# Patient Record
Sex: FEMALE | URBAN - METROPOLITAN AREA
[De-identification: names, ages, dates, MRNs, and addresses within clinical notes are randomized per-mention and may not be internally consistent; named-entity substitution may affect disease eponyms.]

---

## 2022-02-21 ENCOUNTER — TRANSCRIBE ORDER (OUTPATIENT)
Dept: SCHEDULING | Age: 37
End: 2022-02-21

## 2022-02-21 DIAGNOSIS — M25.512 LEFT SHOULDER PAIN: Primary | ICD-10-CM

## 2022-02-21 DIAGNOSIS — M75.42 IMPINGEMENT SYNDROME OF LEFT SHOULDER: ICD-10-CM

## 2022-02-22 ENCOUNTER — TRANSCRIBE ORDER (OUTPATIENT)
Dept: SCHEDULING | Age: 37
End: 2022-02-22

## 2022-02-22 DIAGNOSIS — M75.42 IMPINGEMENT SYNDROME OF LEFT SHOULDER: ICD-10-CM

## 2022-02-22 DIAGNOSIS — M25.512 LEFT SHOULDER PAIN: Primary | ICD-10-CM

## 2024-11-04 ENCOUNTER — HOSPITAL ENCOUNTER (INPATIENT)
Facility: HOSPITAL | Age: 39
LOS: 8 days | Discharge: NURSING, REHAB OR LONG-TERM CARE | DRG: 776 | End: 2024-11-12
Attending: EMERGENCY MEDICINE | Admitting: PSYCHIATRY & NEUROLOGY
Payer: COMMERCIAL

## 2024-11-04 DIAGNOSIS — R45.851 SUICIDAL IDEATION: Primary | ICD-10-CM

## 2024-11-04 PROBLEM — F19.94 SUBSTANCE INDUCED MOOD DISORDER (HCC): Status: ACTIVE | Noted: 2024-11-04

## 2024-11-04 LAB
ALBUMIN SERPL-MCNC: 3.9 G/DL (ref 3.5–5)
ALBUMIN/GLOB SERPL: 1 (ref 1.1–2.2)
ALP SERPL-CCNC: 143 U/L (ref 45–117)
ALT SERPL-CCNC: 54 U/L (ref 12–78)
AMPHET UR QL SCN: POSITIVE
ANION GAP SERPL CALC-SCNC: 12 MMOL/L (ref 2–12)
APPEARANCE UR: CLEAR
AST SERPL-CCNC: 39 U/L (ref 15–37)
BACTERIA URNS QL MICRO: NEGATIVE /HPF
BARBITURATES UR QL SCN: NEGATIVE
BASOPHILS # BLD: 0.1 K/UL (ref 0–0.1)
BASOPHILS NFR BLD: 1 % (ref 0–1)
BENZODIAZ UR QL: NEGATIVE
BILIRUB SERPL-MCNC: 0.5 MG/DL (ref 0.2–1)
BILIRUB UR QL: NEGATIVE
BUN SERPL-MCNC: 5 MG/DL (ref 6–20)
BUN/CREAT SERPL: 6 (ref 12–20)
CALCIUM SERPL-MCNC: 9.9 MG/DL (ref 8.5–10.1)
CANNABINOIDS UR QL SCN: POSITIVE
CHLORIDE SERPL-SCNC: 105 MMOL/L (ref 97–108)
CO2 SERPL-SCNC: 24 MMOL/L (ref 21–32)
COCAINE UR QL SCN: NEGATIVE
COLOR UR: ABNORMAL
CREAT SERPL-MCNC: 0.8 MG/DL (ref 0.55–1.02)
DIFFERENTIAL METHOD BLD: ABNORMAL
EOSINOPHIL # BLD: 0.3 K/UL (ref 0–0.4)
EOSINOPHIL NFR BLD: 3 % (ref 0–7)
EPITH CASTS URNS QL MICRO: ABNORMAL /LPF
ERYTHROCYTE [DISTWIDTH] IN BLOOD BY AUTOMATED COUNT: 13.6 % (ref 11.5–14.5)
ETHANOL SERPL-MCNC: <10 MG/DL (ref 0–0.08)
GLOBULIN SER CALC-MCNC: 4.1 G/DL (ref 2–4)
GLUCOSE SERPL-MCNC: 92 MG/DL (ref 65–100)
GLUCOSE UR STRIP.AUTO-MCNC: NEGATIVE MG/DL
HCG UR QL: NEGATIVE
HCT VFR BLD AUTO: 43.2 % (ref 35–47)
HGB BLD-MCNC: 15.3 G/DL (ref 11.5–16)
HGB UR QL STRIP: NEGATIVE
IMM GRANULOCYTES # BLD AUTO: 0.1 K/UL (ref 0–0.04)
IMM GRANULOCYTES NFR BLD AUTO: 1 % (ref 0–0.5)
KETONES UR QL STRIP.AUTO: ABNORMAL MG/DL
LEUKOCYTE ESTERASE UR QL STRIP.AUTO: ABNORMAL
LYMPHOCYTES # BLD: 3.4 K/UL (ref 0.8–3.5)
LYMPHOCYTES NFR BLD: 31 % (ref 12–49)
Lab: ABNORMAL
MCH RBC QN AUTO: 29.2 PG (ref 26–34)
MCHC RBC AUTO-ENTMCNC: 35.4 G/DL (ref 30–36.5)
MCV RBC AUTO: 82.4 FL (ref 80–99)
METHADONE UR QL: NEGATIVE
MONOCYTES # BLD: 0.9 K/UL (ref 0–1)
MONOCYTES NFR BLD: 8 % (ref 5–13)
NEUTS SEG # BLD: 6 K/UL (ref 1.8–8)
NEUTS SEG NFR BLD: 56 % (ref 32–75)
NITRITE UR QL STRIP.AUTO: NEGATIVE
NRBC # BLD: 0 K/UL (ref 0–0.01)
NRBC BLD-RTO: 0 PER 100 WBC
OPIATES UR QL: NEGATIVE
PCP UR QL: NEGATIVE
PH UR STRIP: 6.5 (ref 5–8)
PLATELET # BLD AUTO: 292 K/UL (ref 150–400)
PMV BLD AUTO: 9.4 FL (ref 8.9–12.9)
POTASSIUM SERPL-SCNC: 3.4 MMOL/L (ref 3.5–5.1)
PROT SERPL-MCNC: 8 G/DL (ref 6.4–8.2)
PROT UR STRIP-MCNC: NEGATIVE MG/DL
RBC # BLD AUTO: 5.24 M/UL (ref 3.8–5.2)
RBC #/AREA URNS HPF: ABNORMAL /HPF (ref 0–5)
SODIUM SERPL-SCNC: 141 MMOL/L (ref 136–145)
SP GR UR REFRACTOMETRY: 1.02
URINE CULTURE IF INDICATED: ABNORMAL
UROBILINOGEN UR QL STRIP.AUTO: 1 EU/DL (ref 0.2–1)
WBC # BLD AUTO: 10.7 K/UL (ref 3.6–11)
WBC URNS QL MICRO: ABNORMAL /HPF (ref 0–4)

## 2024-11-04 PROCEDURE — 36415 COLL VENOUS BLD VENIPUNCTURE: CPT

## 2024-11-04 PROCEDURE — 82077 ASSAY SPEC XCP UR&BREATH IA: CPT

## 2024-11-04 PROCEDURE — 1240000000 HC EMOTIONAL WELLNESS R&B

## 2024-11-04 PROCEDURE — 99285 EMERGENCY DEPT VISIT HI MDM: CPT

## 2024-11-04 PROCEDURE — 6370000000 HC RX 637 (ALT 250 FOR IP): Performed by: PSYCHIATRY & NEUROLOGY

## 2024-11-04 PROCEDURE — 80053 COMPREHEN METABOLIC PANEL: CPT

## 2024-11-04 PROCEDURE — 80307 DRUG TEST PRSMV CHEM ANLYZR: CPT

## 2024-11-04 PROCEDURE — 81001 URINALYSIS AUTO W/SCOPE: CPT

## 2024-11-04 PROCEDURE — 85025 COMPLETE CBC W/AUTO DIFF WBC: CPT

## 2024-11-04 PROCEDURE — 6370000000 HC RX 637 (ALT 250 FOR IP): Performed by: EMERGENCY MEDICINE

## 2024-11-04 PROCEDURE — 81025 URINE PREGNANCY TEST: CPT

## 2024-11-04 RX ORDER — HYDROXYZINE HYDROCHLORIDE 25 MG/1
50 TABLET, FILM COATED ORAL 3 TIMES DAILY PRN
Status: DISCONTINUED | OUTPATIENT
Start: 2024-11-04 | End: 2024-11-12 | Stop reason: HOSPADM

## 2024-11-04 RX ORDER — RISPERIDONE 4 MG/1
4 TABLET ORAL DAILY
Status: ON HOLD | COMMUNITY
End: 2024-11-05 | Stop reason: ALTCHOICE

## 2024-11-04 RX ORDER — HALOPERIDOL 5 MG/ML
5 INJECTION INTRAMUSCULAR EVERY 4 HOURS PRN
Status: DISCONTINUED | OUTPATIENT
Start: 2024-11-04 | End: 2024-11-05

## 2024-11-04 RX ORDER — ONDANSETRON 4 MG/1
4 TABLET, FILM COATED ORAL EVERY 8 HOURS PRN
Status: ON HOLD | COMMUNITY
End: 2024-11-05 | Stop reason: ALTCHOICE

## 2024-11-04 RX ORDER — POTASSIUM CHLORIDE 750 MG/1
40 TABLET, EXTENDED RELEASE ORAL ONCE
Status: COMPLETED | OUTPATIENT
Start: 2024-11-04 | End: 2024-11-04

## 2024-11-04 RX ORDER — ACETAMINOPHEN 325 MG/1
650 TABLET ORAL EVERY 4 HOURS PRN
Status: DISCONTINUED | OUTPATIENT
Start: 2024-11-04 | End: 2024-11-12 | Stop reason: HOSPADM

## 2024-11-04 RX ORDER — DIPHENHYDRAMINE HYDROCHLORIDE 50 MG/ML
50 INJECTION INTRAMUSCULAR; INTRAVENOUS EVERY 4 HOURS PRN
Status: DISCONTINUED | OUTPATIENT
Start: 2024-11-04 | End: 2024-11-12 | Stop reason: HOSPADM

## 2024-11-04 RX ORDER — GABAPENTIN 300 MG/1
300 CAPSULE ORAL 3 TIMES DAILY
Status: ON HOLD | COMMUNITY
End: 2024-11-05 | Stop reason: ALTCHOICE

## 2024-11-04 RX ORDER — MAGNESIUM HYDROXIDE/ALUMINUM HYDROXICE/SIMETHICONE 120; 1200; 1200 MG/30ML; MG/30ML; MG/30ML
30 SUSPENSION ORAL EVERY 6 HOURS PRN
Status: DISCONTINUED | OUTPATIENT
Start: 2024-11-04 | End: 2024-11-12 | Stop reason: HOSPADM

## 2024-11-04 RX ORDER — POLYETHYLENE GLYCOL 3350 17 G/17G
17 POWDER, FOR SOLUTION ORAL DAILY PRN
Status: DISCONTINUED | OUTPATIENT
Start: 2024-11-04 | End: 2024-11-12 | Stop reason: HOSPADM

## 2024-11-04 RX ORDER — HALOPERIDOL 5 MG/1
5 TABLET ORAL EVERY 4 HOURS PRN
Status: DISCONTINUED | OUTPATIENT
Start: 2024-11-04 | End: 2024-11-05

## 2024-11-04 RX ORDER — IBUPROFEN 800 MG/1
800 TABLET, FILM COATED ORAL EVERY 8 HOURS PRN
Status: ON HOLD | COMMUNITY
End: 2024-11-05 | Stop reason: ALTCHOICE

## 2024-11-04 RX ORDER — HYDROXYZINE HYDROCHLORIDE 50 MG/1
50 TABLET, FILM COATED ORAL 2 TIMES DAILY PRN
Status: ON HOLD | COMMUNITY
End: 2024-11-05 | Stop reason: ALTCHOICE

## 2024-11-04 RX ORDER — NICOTINE 21 MG/24HR
1 PATCH, TRANSDERMAL 24 HOURS TRANSDERMAL DAILY
Status: DISCONTINUED | OUTPATIENT
Start: 2024-11-04 | End: 2024-11-12 | Stop reason: HOSPADM

## 2024-11-04 RX ORDER — BUPROPION HYDROCHLORIDE 300 MG/1
300 TABLET ORAL EVERY MORNING
Status: ON HOLD | COMMUNITY
End: 2024-11-11 | Stop reason: HOSPADM

## 2024-11-04 RX ORDER — POTASSIUM CHLORIDE 750 MG/1
10 TABLET, EXTENDED RELEASE ORAL ONCE
Status: DISCONTINUED | OUTPATIENT
Start: 2024-11-04 | End: 2024-11-04

## 2024-11-04 RX ORDER — ALPRAZOLAM 1 MG/1
1 TABLET ORAL
Status: COMPLETED | OUTPATIENT
Start: 2024-11-04 | End: 2024-11-04

## 2024-11-04 RX ORDER — TRAZODONE HYDROCHLORIDE 50 MG/1
50 TABLET, FILM COATED ORAL NIGHTLY PRN
Status: DISCONTINUED | OUTPATIENT
Start: 2024-11-04 | End: 2024-11-05

## 2024-11-04 RX ADMIN — POTASSIUM CHLORIDE 40 MEQ: 750 TABLET, EXTENDED RELEASE ORAL at 11:15

## 2024-11-04 RX ADMIN — ALUMINUM HYDROXIDE, MAGNESIUM HYDROXIDE, AND SIMETHICONE 30 ML: 200; 200; 20 SUSPENSION ORAL at 21:12

## 2024-11-04 RX ADMIN — HYDROXYZINE HYDROCHLORIDE 50 MG: 25 TABLET, FILM COATED ORAL at 14:41

## 2024-11-04 RX ADMIN — ALPRAZOLAM 1 MG: 1 TABLET ORAL at 10:40

## 2024-11-04 RX ADMIN — ACETAMINOPHEN 650 MG: 325 TABLET ORAL at 21:12

## 2024-11-04 ASSESSMENT — PAIN SCALES - GENERAL
PAINLEVEL_OUTOF10: 0
PAINLEVEL_OUTOF10: 1
PAINLEVEL_OUTOF10: 0

## 2024-11-04 ASSESSMENT — PAIN - FUNCTIONAL ASSESSMENT: PAIN_FUNCTIONAL_ASSESSMENT: 0-10

## 2024-11-04 ASSESSMENT — SLEEP AND FATIGUE QUESTIONNAIRES
DO YOU HAVE DIFFICULTY SLEEPING: YES
AVERAGE NUMBER OF SLEEP HOURS: 4
DO YOU USE A SLEEP AID: NO

## 2024-11-04 ASSESSMENT — LIFESTYLE VARIABLES
HOW OFTEN DO YOU HAVE A DRINK CONTAINING ALCOHOL: 4 OR MORE TIMES A WEEK
HOW MANY STANDARD DRINKS CONTAINING ALCOHOL DO YOU HAVE ON A TYPICAL DAY: 3 OR 4

## 2024-11-04 NOTE — ED NOTES
Bedside and Verbal shift change report given to LAURIE Salazar (oncoming nurse) by LAURIE Quiroz (offgoing nurse). Report included the following information Nurse Handoff Report, ED Encounter Summary, ED SBAR, Intake/Output, MAR, and Recent Results.

## 2024-11-04 NOTE — ED NOTES
Pt presents to ED via EMS complaining of worsening anxiety, auditory hallucinations, HI and SI with a plan since last night. Pt reports she stopped taking her  psych medications 4 days ago and she had a panic attack today. Pt reports she recently relapsed on alcohol and marijuana. Pt states she wants to \"burn shit down\" and states \"I'm not right in the right right now.\" Pt reports she wants to take a knife and stab herself in the throat and also stab her roommate multiple times. Pt endorses hx of schizoaffective disorder, anxiety, bipolar, and manic depression. Pt is alert and oriented x 4, RR even and unlabored, skin is warm and dry. Pt appears in NAD at this time. Assessment completed and pt updated on plan of care.  Call bell in reach.    Emergency Department Nursing Plan of Care  The Nursing Plan of Care is developed from the Nursing assessment and Emergency Department Attending provider initial evaluation.  The plan of care may be reviewed in the “ED Provider note”.      The Plan of Care was developed with the following considerations:  Patient / Family readiness to learn indicated by:Refer to Medical chart in [facility]  Persons(s) to be included in education: Refer to Medical chart in [facility]  Barriers to Learning/Limitations:Normal     Signed    Gabriella Bourne RN  11/4/2024 10:15 AM

## 2024-11-04 NOTE — CARE COORDINATION
11/04/24 1444   Suicidal Ideation   Wish to be Dead Lifetime - Yes;Past 1 month - Yes   Non-Specific Active Suicidal Thoughts Lifetime - No;Past 1 month - No   Suicidal Behavior Trigger Wish to be Dead   Intensity of Ideation   Lifetime - Most Severe Ideation 4   Lifetime - Most Recent Ideation 3   Frequency 2-5 times in a week   Duration 4-8 hours/most of day   Controllability Can control thoughts with a lot of difficulty   Deterrents Deterrents most likely did not stop you   Reasons for Ideation Mostly to end or stop the pain   Suicidal Behavior   Actual Attempt Lifetime - Yes;Past 3 months - No   Total # of Attempts 1   Has subject engaged in Non-Suicidal Self-Injurious Behavior? Lifetime - No;Past 3 months - No   Interrupted Attempt Lifetime - No;Past 3 months - No   Total # of interrupted 0   Aborted or Self-Interrupted Attempt Lifetime - Yes;Past 3 months - No   Total # of aborted or self-interrupted 1   Preparatory Acts or Behavior Lifetime - Yes;Past 3 months - No   Total # of Preparatory Acts 1   Actual Lethality/Medical Damage 0   Potential Lethality 1     ______________________________________________  TREVER Ferguson, Supervisee in Social Work  [phone]  [email or web address]  11/04/24    
Work  [phone]  [email or web address]  11/04/24

## 2024-11-04 NOTE — ED NOTES
TRANSFER - OUT REPORT:    Verbal report given to LAURIE Conde on Caity Estrella  being transferred to [facility] for routine progression of patient care       Report consisted of patient's Situation, Background, Assessment and   Recommendations(SBAR).     Information from the following report(s) Nurse Handoff Report, ED Encounter Summary, ED SBAR, Adult Overview, Intake/Output, MAR, Recent Results, Med Rec Status, Quality Measures, and Neuro Assessment was reviewed with the receiving nurse.    [place] Fall Assessment:    Presents to emergency department  because of falls (Syncope, seizure, or loss of consciousness): No  Age > 70: No  Altered Mental Status, Intoxication with alcohol or substance confusion (Disorientation, impaired judgment, poor safety awaremess, or inability to follow instructions): No  Impaired Mobility: Ambulates or transfers with assistive devices or assistance; Unable to ambulate or transer.: No  Nursing Judgement: No        Lines:       Opportunity for questions and clarification was provided.      Patient transported with:  Security and Pts belongings

## 2024-11-04 NOTE — PROGRESS NOTES
Admission Medication Reconciliation:    Information obtained from:  patient interview, communication with [facility] pharmacy (phone number: [phone]), Insurance claims data, review of EMR, and [facility]  RxQuery data available¹:  YES    Comments/Recommendations: Updated PTA meds/reviewed patient's allergies.    1) Upon quick review of the patient's belongings. She had an empty hydroxyzine 50 mg bottle that was filled in August, written as 1 tab BID prn anxiety. She also had a gabapentin bottle from 10/23/24 for 300 mg caps that she stated was to help her \"come off of methadone\", there was 1 pill left in the bottle.    During an interview with the patient, she stated that PTA she was taking Depakote 500 mg daily, risperidone 4 mg daily, alprazolam 2 mg TID prn, and Wellbutrin 300 mg daily. Last time she took any medications was 4 days ago.    She has recent (10/24/24) short day supply fills for clonidine 0.1 mg q8h that she states was for BP, ibuprofen 800 mg q8h, naloxone 4 mg spray, and ondansetron 4 mg ODT q8h.    Patient recommended that we call [facility] on [address] in [place], [place] to get an accurate fill history. Spoke with [facility] employee who stated that the 2 most recents fills for this patient were on 10/18/2024 and 07/2024:  Wellbutrin  mg AM on 10/18  Aleve  mg q12h #50 in 07/2024  Suboxone 4mg-1mg film #16 in 07/2024  Prazosin 2 mg HS #30 in 07/2024    2)  The [place] Prescription Monitoring Program () was assessed to determine fill history of any controlled medications. The patient has filled the following controlled medications in the last 2 years.  - 10/24/2024: Gabapentin 300 mg, #21 for 7 day supply  - 10/24/2024: Naloxone 4 mg nasal spray, #2 for 1 day supply  - 07/26/2024: Suboxone 4 mg - 1 mg film, #16 for 16 day supply  - 07/26/2024: Buprenorphine-Naloxone 4 mg-1 mg film, #16 for 7 day supply  - 07/12/2024: Suboxone 8 mg - 2 mg film, #14 for 14 day supply    3)  Medication changes (since last

## 2024-11-04 NOTE — ED PROVIDER NOTES
[facility] 3 ACUTE BEHAV [facility]  EMERGENCY DEPARTMENT ENCOUNTER       Pt Name: Caity Estrella  MRN: [id number]  Birthdate 1985  Date of evaluation: 11/4/2024  Provider: Trisha Rm MD   PCP: No primary care provider on file.  Note Started: 5:34 PM 11/4/24     (Please note that parts of this dictation were completed with voice recognition software. Quite often unanticipated grammatical, syntax, homophones, and other interpretive errors are inadvertently transcribed by the computer software. Please disregards these errors. Please excuse any errors that have escaped final proofreading.)    CHIEF COMPLAINT       Chief Complaint   Patient presents with    Mental Health Problem        HISTORY OF PRESENT ILLNESS: 1 or more elements      History From: patient, History limited by:  none     Caity Estrella is a 39 y.o. female with a history of schizoaffective disorder, bipolar, depression, anxiety who presents via EMS with suicidal ideation.  Patient explains \"I stopped my meds and I have been trying to get an appointment.\"  Has been off her psych meds for 4 days.  States now \"the voices are bad.\"  States she is having \"bad anxiety.\" The  morning she had a panic attack.  Explains that she relapsed on marijuana and alcohol because the voices were so bad they were preventing her from sleep and she was smoking and drinking overnight to try and calm her nerves and get to the point where she could just pass out ...but instead she had a panic attack.  The voices are telling her to stab her roommate multiple times and also take a knife to her throat.  She states she flips back and forth between wanting to kill her roommate and wanting to kill herself.  Also states she feels \"like burning should down.\"  Admits that she is \"not right in the head.\"  Is willing to stay voluntarily for help. Requesting anxiolytic.     Nursing Notes were all reviewed and agreed with or any disagreements were addressed in the HPI.     REVIEW OF SYSTEMS

## 2024-11-04 NOTE — ED TRIAGE NOTES
Pt presents to ED via EMS. Per EMS pt reports having panic attack at approximately 0500 and having worsening SOB. When EMS arrived pt endorsed SI with a plan to cut her throat with a knife and HI. Pt reports complex psych hx and being without her meds x4 days and substance and etoh use. Pt reports marijuana use and ETOH use last night. During triage assessment pt communicated with this RN she is also having auditory hallucinations telling her to harm herself and others specifically her room mate.     VSS at this time, Pt within line of sight of RN and 1:1 sitter placed. CIWA 6 at this time. [employer] notified of consult. Personal belongings gathered and wanded by security. Pt requesting to wait to change into paper scrubs but verbalized understanding of needing to be in them.
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
## 2024-11-04 NOTE — PROGRESS NOTES
BSMART assessment completed, and suicide risk level noted to be high.  Charge Nurse Kaylie and Physician Dr Rm notified. Concerns not observed.  Security/Off- has not been notified.

## 2024-11-05 PROCEDURE — 6370000000 HC RX 637 (ALT 250 FOR IP): Performed by: PSYCHIATRY & NEUROLOGY

## 2024-11-05 PROCEDURE — 1140000000 HC RM PRIVATE PSYCH

## 2024-11-05 RX ORDER — CHLORPROMAZINE HCI 25 MG/ML
50 INJECTION INTRAMUSCULAR 4 TIMES DAILY PRN
Status: DISCONTINUED | OUTPATIENT
Start: 2024-11-05 | End: 2024-11-12 | Stop reason: HOSPADM

## 2024-11-05 RX ORDER — RISPERIDONE 1 MG/1
2 TABLET ORAL DAILY
Status: DISCONTINUED | OUTPATIENT
Start: 2024-11-06 | End: 2024-11-06

## 2024-11-05 RX ORDER — GABAPENTIN 300 MG/1
300 CAPSULE ORAL 3 TIMES DAILY
Status: DISCONTINUED | OUTPATIENT
Start: 2024-11-05 | End: 2024-11-12 | Stop reason: HOSPADM

## 2024-11-05 RX ORDER — BUPROPION HYDROCHLORIDE 150 MG/1
300 TABLET ORAL EVERY MORNING
Status: DISCONTINUED | OUTPATIENT
Start: 2024-11-05 | End: 2024-11-12 | Stop reason: HOSPADM

## 2024-11-05 RX ORDER — ONDANSETRON 4 MG/1
4 TABLET, ORALLY DISINTEGRATING ORAL EVERY 8 HOURS PRN
Status: DISCONTINUED | OUTPATIENT
Start: 2024-11-05 | End: 2024-11-12 | Stop reason: HOSPADM

## 2024-11-05 RX ORDER — CHLORPROMAZINE HYDROCHLORIDE 50 MG/1
50 TABLET, FILM COATED ORAL 4 TIMES DAILY PRN
Status: DISCONTINUED | OUTPATIENT
Start: 2024-11-05 | End: 2024-11-12 | Stop reason: HOSPADM

## 2024-11-05 RX ORDER — QUETIAPINE FUMARATE 200 MG/1
200 TABLET, FILM COATED ORAL NIGHTLY
Status: DISCONTINUED | OUTPATIENT
Start: 2024-11-05 | End: 2024-11-06

## 2024-11-05 RX ORDER — IBUPROFEN 400 MG/1
800 TABLET, FILM COATED ORAL EVERY 8 HOURS PRN
Status: DISCONTINUED | OUTPATIENT
Start: 2024-11-05 | End: 2024-11-12 | Stop reason: HOSPADM

## 2024-11-05 RX ADMIN — BUPROPION HYDROCHLORIDE 300 MG: 150 TABLET, EXTENDED RELEASE ORAL at 12:48

## 2024-11-05 RX ADMIN — QUETIAPINE FUMARATE 200 MG: 200 TABLET ORAL at 22:29

## 2024-11-05 RX ADMIN — HALOPERIDOL 5 MG: 5 TABLET ORAL at 09:07

## 2024-11-05 RX ADMIN — GABAPENTIN 300 MG: 300 CAPSULE ORAL at 13:52

## 2024-11-05 RX ADMIN — GABAPENTIN 300 MG: 300 CAPSULE ORAL at 22:29

## 2024-11-05 RX ADMIN — RISPERIDONE 4 MG: 1 TABLET, FILM COATED ORAL at 12:44

## 2024-11-05 RX ADMIN — HYDROXYZINE HYDROCHLORIDE 50 MG: 25 TABLET, FILM COATED ORAL at 09:07

## 2024-11-05 ASSESSMENT — PAIN SCALES - GENERAL
PAINLEVEL_OUTOF10: 0
PAINLEVEL_OUTOF10: 0

## 2024-11-05 NOTE — PROGRESS NOTES
Laboratory Monitoring for Mood Stabilizers and Antipsychotics    Recommended baseline monitoring has NOT been completed based on this patient's current medication regimen.The following labs have been ordered to complete baseline monitoring: Hgb A1c, lipid panel, TSH          This patient is currently prescribed the following medication(s):   Current Facility-Administered Medications: buPROPion (WELLBUTRIN XL) extended release tablet 300 mg, 300 mg, Oral, QAM  gabapentin (NEURONTIN) capsule 300 mg, 300 mg, Oral, TID  ibuprofen (ADVIL;MOTRIN) tablet 800 mg, 800 mg, Oral, Q8H PRN  ondansetron (ZOFRAN-ODT) disintegrating tablet 4 mg, 4 mg, Oral, Q8H PRN  risperiDONE (RISPERDAL) tablet 4 mg, 4 mg, Oral, Daily  acetaminophen (TYLENOL) tablet 650 mg, 650 mg, Oral, Q4H PRN  polyethylene glycol (GLYCOLAX) packet 17 g, 17 g, Oral, Daily PRN  aluminum & magnesium hydroxide-simethicone (MAALOX) 200-200-20 MG/5ML suspension 30 mL, 30 mL, Oral, Q6H PRN  hydrOXYzine HCl (ATARAX) tablet 50 mg, 50 mg, Oral, TID PRN  haloperidol (HALDOL) tablet 5 mg, 5 mg, Oral, Q4H PRN **OR** haloperidol lactate (HALDOL) injection 5 mg, 5 mg, IntraMUSCular, Q4H PRN  diphenhydrAMINE (BENADRYL) injection 50 mg, 50 mg, IntraMUSCular, Q4H PRN  traZODone (DESYREL) tablet 50 mg, 50 mg, Oral, Nightly PRN  nicotine polacrilex (COMMIT) lozenge 4 mg, 4 mg, Oral, Q1H PRN  nicotine (NICODERM CQ) 14 MG/24HR 1 patch, 1 patch, TransDERmal, Daily      The following labs have been completed for monitoring of antipsychotics and/or mood stabilizers:    Height, Weight, BMI Estimation  Estimated body mass index is 31.62 kg/m² as calculated from the following:    Height as of this encounter: 1.651 m (5' 5\").    Weight as of this encounter: 86.2 kg (190 lb).     Vital Signs/Blood Pressure  BP (!) 123/94   Pulse 88   Temp 98.2 °F (36.8 °C) (Oral)   Resp 16   Ht 1.651 m (5' 5\")   Wt 86.2 kg (190 lb)   SpO2 100%   BMI 31.62 kg/m²     Renal Function, Hepatic Function

## 2024-11-05 NOTE — PROGRESS NOTES
Behavioral Services  Medicare Certification Upon Admission    I certify that this patient's inpatient psychiatric hospital admission is medically necessary for:    [x] (1) Treatment which could reasonably be expected to improve this patient's condition,       [x] (2) Or for diagnostic study;     AND     [x](2) The inpatient psychiatric services are provided while the individual is under the care of a physician and are included in the individualized plan of care.    Estimated length of stay/service 5 - 7 days    Plan for post-hospital care per social work    Electronically signed by Elmer Reyes MD on 11/5/2024 at 12:00 PM
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
## 2024-11-06 PROCEDURE — 6370000000 HC RX 637 (ALT 250 FOR IP): Performed by: PSYCHIATRY & NEUROLOGY

## 2024-11-06 PROCEDURE — 1140000000 HC RM PRIVATE PSYCH

## 2024-11-06 RX ADMIN — GABAPENTIN 300 MG: 300 CAPSULE ORAL at 08:36

## 2024-11-06 RX ADMIN — GABAPENTIN 300 MG: 300 CAPSULE ORAL at 20:48

## 2024-11-06 RX ADMIN — QUETIAPINE FUMARATE 300 MG: 200 TABLET ORAL at 20:48

## 2024-11-06 RX ADMIN — BUPROPION HYDROCHLORIDE 300 MG: 150 TABLET, EXTENDED RELEASE ORAL at 08:36

## 2024-11-06 RX ADMIN — RISPERIDONE 2 MG: 1 TABLET, FILM COATED ORAL at 08:36

## 2024-11-06 RX ADMIN — ACETAMINOPHEN 650 MG: 325 TABLET ORAL at 20:48

## 2024-11-06 RX ADMIN — GABAPENTIN 300 MG: 300 CAPSULE ORAL at 14:12

## 2024-11-06 ASSESSMENT — PAIN - FUNCTIONAL ASSESSMENT: PAIN_FUNCTIONAL_ASSESSMENT: ACTIVITIES ARE NOT PREVENTED

## 2024-11-06 ASSESSMENT — PAIN DESCRIPTION - LOCATION: LOCATION: HEAD

## 2024-11-06 ASSESSMENT — PAIN SCALES - GENERAL
PAINLEVEL_OUTOF10: 8
PAINLEVEL_OUTOF10: 0

## 2024-11-06 ASSESSMENT — PAIN DESCRIPTION - ORIENTATION: ORIENTATION: ANTERIOR

## 2024-11-06 ASSESSMENT — PAIN DESCRIPTION - DESCRIPTORS: DESCRIPTORS: PRESSURE

## 2024-11-06 NOTE — PROGRESS NOTES
Auto-MST trigger per RN admission assessment.  No acute nutrition or weight issues identified. Pt meal compliant.  Pt has food preferences which are documented in chart and kitchen is aware.  Hx notable for obesity.    Ht: 5'5\"  Wt: 190 lb  BMI: 31.62 kg/(m^2) c/w obesity class I  Est energy needs: 1875 kcal, 68 g protein, 2200 mL fluids  Pt will consume < 75% of meals at follow up 7-10 days  LOS, MST

## 2024-11-06 NOTE — PROGRESS NOTES
RN Shift Assessment Note:    Caity was met in her room where she was observed resting in bed, but easily arouse when her named was called out to her. Pt was observed to be isolative to her room during this shift.   Pt did not display any signs of distress or complaints of adverse reactions to medications. Pt denies pain, She did not answer any assessment questions. Pt just laid back down and went to sleep. Pt will continue to be monitored for her safety. Pt slept for a total of 8 hrs.

## 2024-11-07 LAB
CHOLEST SERPL-MCNC: 197 MG/DL
EST. AVERAGE GLUCOSE BLD GHB EST-MCNC: 117 MG/DL
HBA1C MFR BLD: 5.7 % (ref 4–5.6)
HDLC SERPL-MCNC: 53 MG/DL
HDLC SERPL: 3.7 (ref 0–5)
LDLC SERPL CALC-MCNC: 89 MG/DL (ref 0–100)
RPR SER QL: NONREACTIVE
TRIGL SERPL-MCNC: 275 MG/DL
TSH SERPL DL<=0.05 MIU/L-ACNC: 1.39 UIU/ML (ref 0.36–3.74)
VLDLC SERPL CALC-MCNC: 55 MG/DL

## 2024-11-07 PROCEDURE — 6370000000 HC RX 637 (ALT 250 FOR IP): Performed by: PSYCHIATRY & NEUROLOGY

## 2024-11-07 PROCEDURE — 86592 SYPHILIS TEST NON-TREP QUAL: CPT

## 2024-11-07 PROCEDURE — 36415 COLL VENOUS BLD VENIPUNCTURE: CPT

## 2024-11-07 PROCEDURE — 1140000000 HC RM PRIVATE PSYCH

## 2024-11-07 PROCEDURE — 84443 ASSAY THYROID STIM HORMONE: CPT

## 2024-11-07 PROCEDURE — 80061 LIPID PANEL: CPT

## 2024-11-07 PROCEDURE — 87491 CHLMYD TRACH DNA AMP PROBE: CPT

## 2024-11-07 PROCEDURE — 87591 N.GONORRHOEAE DNA AMP PROB: CPT

## 2024-11-07 PROCEDURE — 83036 HEMOGLOBIN GLYCOSYLATED A1C: CPT

## 2024-11-07 RX ORDER — NALOXONE HYDROCHLORIDE 0.4 MG/ML
0.4 INJECTION, SOLUTION INTRAMUSCULAR; INTRAVENOUS; SUBCUTANEOUS PRN
Status: DISCONTINUED | OUTPATIENT
Start: 2024-11-07 | End: 2024-11-12 | Stop reason: HOSPADM

## 2024-11-07 RX ORDER — TRAMADOL HYDROCHLORIDE 50 MG/1
50 TABLET ORAL EVERY 6 HOURS PRN
Status: DISCONTINUED | OUTPATIENT
Start: 2024-11-07 | End: 2024-11-08

## 2024-11-07 RX ADMIN — ACETAMINOPHEN 650 MG: 325 TABLET ORAL at 18:06

## 2024-11-07 RX ADMIN — TRAMADOL HYDROCHLORIDE 50 MG: 50 TABLET ORAL at 20:19

## 2024-11-07 RX ADMIN — ACETAMINOPHEN 650 MG: 325 TABLET ORAL at 12:25

## 2024-11-07 RX ADMIN — GABAPENTIN 300 MG: 300 CAPSULE ORAL at 13:20

## 2024-11-07 RX ADMIN — Medication: at 16:14

## 2024-11-07 RX ADMIN — GABAPENTIN 300 MG: 300 CAPSULE ORAL at 08:45

## 2024-11-07 RX ADMIN — BUPROPION HYDROCHLORIDE 300 MG: 150 TABLET, EXTENDED RELEASE ORAL at 08:44

## 2024-11-07 RX ADMIN — NICOTINE POLACRILEX 4 MG: 4 LOZENGE ORAL at 16:13

## 2024-11-07 RX ADMIN — QUETIAPINE FUMARATE 300 MG: 200 TABLET ORAL at 20:18

## 2024-11-07 RX ADMIN — HYDROXYZINE HYDROCHLORIDE 50 MG: 25 TABLET, FILM COATED ORAL at 12:25

## 2024-11-07 RX ADMIN — GABAPENTIN 300 MG: 300 CAPSULE ORAL at 20:18

## 2024-11-07 RX ADMIN — TRAMADOL HYDROCHLORIDE 50 MG: 50 TABLET ORAL at 14:28

## 2024-11-07 ASSESSMENT — PAIN DESCRIPTION - LOCATION
LOCATION: TEETH

## 2024-11-07 ASSESSMENT — PAIN SCALES - GENERAL
PAINLEVEL_OUTOF10: 3
PAINLEVEL_OUTOF10: 10
PAINLEVEL_OUTOF10: 10
PAINLEVEL_OUTOF10: 6
PAINLEVEL_OUTOF10: 9
PAINLEVEL_OUTOF10: 0
PAINLEVEL_OUTOF10: 7

## 2024-11-07 ASSESSMENT — PAIN SCALES - WONG BAKER: WONGBAKER_NUMERICALRESPONSE: HURTS LITTLE MORE

## 2024-11-07 ASSESSMENT — PAIN DESCRIPTION - ORIENTATION: ORIENTATION: LEFT

## 2024-11-07 ASSESSMENT — PAIN DESCRIPTION - DESCRIPTORS
DESCRIPTORS: ACHING
DESCRIPTORS: ACHING
DESCRIPTORS: ACHING;THROBBING
DESCRIPTORS: ACHING;THROBBING
DESCRIPTORS: ACHING
DESCRIPTORS: ACHING

## 2024-11-07 NOTE — PROGRESS NOTES
RN Shift Assessment Note:     Caity was met in the hallway as she came to the nurses station to ask for her night time medications. Pt was observed to be isolative to her room during this shift.   Pt did not display any signs of distress or complaints of adverse reactions to medications. Pt denies SI, HI,AVH. Pt rates anxiety 7/10 and depression 8/10. Pt endorse headache pain 8/10 saying she has a migraine. Pt received PRN Tylenol. Pt was cooperative with answering all assessment questions. Pt will continue to be monitored for her safety.

## 2024-11-07 NOTE — PROGRESS NOTES
Laboratory Monitoring for Mood Stabilizers and Antipsychotics    Recommended baseline monitoring has been completed based on this patient's current medication regimen     This patient is currently prescribed the following medication(s):   Current Facility-Administered Medications: QUEtiapine (SEROQUEL) tablet 300 mg, 300 mg, Oral, Nightly  buPROPion (WELLBUTRIN XL) extended release tablet 300 mg, 300 mg, Oral, QAM  gabapentin (NEURONTIN) capsule 300 mg, 300 mg, Oral, TID  ibuprofen (ADVIL;MOTRIN) tablet 800 mg, 800 mg, Oral, Q8H PRN  ondansetron (ZOFRAN-ODT) disintegrating tablet 4 mg, 4 mg, Oral, Q8H PRN  chlorproMAZINE (THORAZINE) tablet 50 mg, 50 mg, Oral, 4x Daily PRN **OR** chlorproMAZINE (THORAZINE) injection 50 mg, 50 mg, IntraMUSCular, 4x Daily PRN  acetaminophen (TYLENOL) tablet 650 mg, 650 mg, Oral, Q4H PRN  polyethylene glycol (GLYCOLAX) packet 17 g, 17 g, Oral, Daily PRN  aluminum & magnesium hydroxide-simethicone (MAALOX) 200-200-20 MG/5ML suspension 30 mL, 30 mL, Oral, Q6H PRN  hydrOXYzine HCl (ATARAX) tablet 50 mg, 50 mg, Oral, TID PRN  diphenhydrAMINE (BENADRYL) injection 50 mg, 50 mg, IntraMUSCular, Q4H PRN  nicotine polacrilex (COMMIT) lozenge 4 mg, 4 mg, Oral, Q1H PRN  nicotine (NICODERM CQ) 14 MG/24HR 1 patch, 1 patch, TransDERmal, Daily      The following labs have been completed for monitoring of antipsychotics and/or mood stabilizers:    Height, Weight, BMI Estimation  Estimated body mass index is 31.62 kg/m² as calculated from the following:    Height as of this encounter: 1.651 m (5' 5\").    Weight as of this encounter: 86.2 kg (190 lb).     Vital Signs/Blood Pressure  /78   Pulse 97   Temp 98.2 °F (36.8 °C) (Oral)   Resp 16   Ht 1.651 m (5' 5\")   Wt 86.2 kg (190 lb)   SpO2 98%   BMI 31.62 kg/m²     Renal Function, Hepatic Function and Chemistry  Estimated Creatinine Clearance: 100 mL/min (based on SCr of 0.8 mg/dL).    Lab Results   Component Value Date/Time

## 2024-11-08 LAB
C TRACH DNA SPEC QL NAA+PROBE: NEGATIVE
N GONORRHOEA DNA SPEC QL NAA+PROBE: NEGATIVE
SAMPLE TYPE: NORMAL
SERVICE CMNT-IMP: NORMAL
SPECIMEN SOURCE: NORMAL

## 2024-11-08 PROCEDURE — 6370000000 HC RX 637 (ALT 250 FOR IP): Performed by: PSYCHIATRY & NEUROLOGY

## 2024-11-08 PROCEDURE — 1140000000 HC RM PRIVATE PSYCH

## 2024-11-08 RX ORDER — PRAZOSIN HYDROCHLORIDE 1 MG/1
1 CAPSULE ORAL NIGHTLY
Status: DISCONTINUED | OUTPATIENT
Start: 2024-11-08 | End: 2024-11-12 | Stop reason: HOSPADM

## 2024-11-08 RX ORDER — QUETIAPINE FUMARATE 200 MG/1
400 TABLET, FILM COATED ORAL NIGHTLY
Status: DISCONTINUED | OUTPATIENT
Start: 2024-11-08 | End: 2024-11-11

## 2024-11-08 RX ORDER — TRAMADOL HYDROCHLORIDE 50 MG/1
75 TABLET ORAL EVERY 6 HOURS PRN
Status: DISCONTINUED | OUTPATIENT
Start: 2024-11-08 | End: 2024-11-12 | Stop reason: HOSPADM

## 2024-11-08 RX ADMIN — GABAPENTIN 300 MG: 300 CAPSULE ORAL at 20:38

## 2024-11-08 RX ADMIN — NICOTINE POLACRILEX 4 MG: 4 LOZENGE ORAL at 06:13

## 2024-11-08 RX ADMIN — QUETIAPINE FUMARATE 400 MG: 200 TABLET ORAL at 20:37

## 2024-11-08 RX ADMIN — TRAMADOL HYDROCHLORIDE 75 MG: 50 TABLET ORAL at 10:52

## 2024-11-08 RX ADMIN — TRAMADOL HYDROCHLORIDE 75 MG: 50 TABLET ORAL at 16:51

## 2024-11-08 RX ADMIN — Medication: at 16:49

## 2024-11-08 RX ADMIN — TRAMADOL HYDROCHLORIDE 50 MG: 50 TABLET ORAL at 03:24

## 2024-11-08 RX ADMIN — Medication: at 12:28

## 2024-11-08 RX ADMIN — BUPROPION HYDROCHLORIDE 300 MG: 150 TABLET, EXTENDED RELEASE ORAL at 07:38

## 2024-11-08 RX ADMIN — ACETAMINOPHEN 650 MG: 325 TABLET ORAL at 06:18

## 2024-11-08 RX ADMIN — GABAPENTIN 300 MG: 300 CAPSULE ORAL at 13:35

## 2024-11-08 RX ADMIN — IBUPROFEN 800 MG: 400 TABLET, FILM COATED ORAL at 10:53

## 2024-11-08 RX ADMIN — IBUPROFEN 800 MG: 400 TABLET, FILM COATED ORAL at 20:37

## 2024-11-08 RX ADMIN — GABAPENTIN 300 MG: 300 CAPSULE ORAL at 09:07

## 2024-11-08 RX ADMIN — PRAZOSIN HYDROCHLORIDE 1 MG: 1 CAPSULE ORAL at 20:37

## 2024-11-08 ASSESSMENT — PAIN DESCRIPTION - DESCRIPTORS
DESCRIPTORS: ACHING

## 2024-11-08 ASSESSMENT — PAIN DESCRIPTION - LOCATION
LOCATION: TEETH
LOCATION: MOUTH
LOCATION: TEETH

## 2024-11-08 ASSESSMENT — PAIN SCALES - GENERAL
PAINLEVEL_OUTOF10: 7
PAINLEVEL_OUTOF10: 3
PAINLEVEL_OUTOF10: 7
PAINLEVEL_OUTOF10: 8
PAINLEVEL_OUTOF10: 4
PAINLEVEL_OUTOF10: 7
PAINLEVEL_OUTOF10: 8
PAINLEVEL_OUTOF10: 5
PAINLEVEL_OUTOF10: 9
PAINLEVEL_OUTOF10: 6

## 2024-11-08 ASSESSMENT — PAIN DESCRIPTION - ORIENTATION
ORIENTATION: UPPER
ORIENTATION: UPPER

## 2024-11-09 PROCEDURE — 6370000000 HC RX 637 (ALT 250 FOR IP): Performed by: NURSE PRACTITIONER

## 2024-11-09 PROCEDURE — 6370000000 HC RX 637 (ALT 250 FOR IP): Performed by: PSYCHIATRY & NEUROLOGY

## 2024-11-09 PROCEDURE — 1140000000 HC RM PRIVATE PSYCH

## 2024-11-09 RX ORDER — HYDROCODONE BITARTRATE AND ACETAMINOPHEN 7.5; 325 MG/1; MG/1
1 TABLET ORAL ONCE
Status: COMPLETED | OUTPATIENT
Start: 2024-11-09 | End: 2024-11-09

## 2024-11-09 RX ADMIN — GABAPENTIN 300 MG: 300 CAPSULE ORAL at 20:34

## 2024-11-09 RX ADMIN — GABAPENTIN 300 MG: 300 CAPSULE ORAL at 13:13

## 2024-11-09 RX ADMIN — TRAMADOL HYDROCHLORIDE 75 MG: 50 TABLET ORAL at 06:14

## 2024-11-09 RX ADMIN — Medication: at 13:13

## 2024-11-09 RX ADMIN — QUETIAPINE FUMARATE 400 MG: 200 TABLET ORAL at 20:34

## 2024-11-09 RX ADMIN — Medication: at 09:25

## 2024-11-09 RX ADMIN — GABAPENTIN 300 MG: 300 CAPSULE ORAL at 08:48

## 2024-11-09 RX ADMIN — HYDROCODONE BITARTRATE AND ACETAMINOPHEN 1 TABLET: 7.5; 325 TABLET ORAL at 17:03

## 2024-11-09 RX ADMIN — PRAZOSIN HYDROCHLORIDE 1 MG: 1 CAPSULE ORAL at 20:33

## 2024-11-09 RX ADMIN — NICOTINE POLACRILEX 4 MG: 4 LOZENGE ORAL at 09:26

## 2024-11-09 RX ADMIN — NICOTINE POLACRILEX 4 MG: 4 LOZENGE ORAL at 06:15

## 2024-11-09 RX ADMIN — BUPROPION HYDROCHLORIDE 300 MG: 150 TABLET, EXTENDED RELEASE ORAL at 08:48

## 2024-11-09 RX ADMIN — TRAMADOL HYDROCHLORIDE 75 MG: 50 TABLET ORAL at 13:13

## 2024-11-09 ASSESSMENT — PAIN DESCRIPTION - LOCATION
LOCATION: TEETH

## 2024-11-09 ASSESSMENT — PAIN SCALES - GENERAL
PAINLEVEL_OUTOF10: 5
PAINLEVEL_OUTOF10: 7
PAINLEVEL_OUTOF10: 7
PAINLEVEL_OUTOF10: 4
PAINLEVEL_OUTOF10: 7
PAINLEVEL_OUTOF10: 4
PAINLEVEL_OUTOF10: 0

## 2024-11-09 ASSESSMENT — PAIN DESCRIPTION - DESCRIPTORS
DESCRIPTORS: ACHING

## 2024-11-09 ASSESSMENT — PAIN SCALES - WONG BAKER: WONGBAKER_NUMERICALRESPONSE: NO HURT

## 2024-11-09 NOTE — PROGRESS NOTES
Night Shift assessment completed.   Caity is visible in the milieu, alert, calm, cooperative and free from distress. Affect constricted. Mood anxious/depressed. Endorses AH of voices saying '' negative stuff ''. Denies anxiety, depression, SI/HI/VH and pain. C-SSRS, No risk. Interacts appropriately and able to make needs known. Pt reports having a nice day, was able to braid the hair of a peer. No aggression or violence. No behavioral issues observed.     Nursing Intervention: VS T 97.8 °F (36.6 °C),HR 93 R 18, B/P (!) 128/90, O2 Sat 100 %. Med and meal compliant. Requested and received PRN Motrin for toothache of 7/10 with good effect. Emotional support and encouragement provided. No side effects from medications observed.     Response: Positive.    Plan: Monitoring for safety and behavior continues q 15 mins. Pt appears to have slept for 8 hours.

## 2024-11-10 PROCEDURE — 1140000000 HC RM PRIVATE PSYCH

## 2024-11-10 PROCEDURE — 6370000000 HC RX 637 (ALT 250 FOR IP): Performed by: PSYCHIATRY & NEUROLOGY

## 2024-11-10 RX ADMIN — BUPROPION HYDROCHLORIDE 300 MG: 150 TABLET, EXTENDED RELEASE ORAL at 07:15

## 2024-11-10 RX ADMIN — QUETIAPINE FUMARATE 400 MG: 200 TABLET ORAL at 20:53

## 2024-11-10 RX ADMIN — TRAMADOL HYDROCHLORIDE 75 MG: 50 TABLET ORAL at 07:15

## 2024-11-10 RX ADMIN — TRAMADOL HYDROCHLORIDE 75 MG: 50 TABLET ORAL at 00:01

## 2024-11-10 RX ADMIN — TRAMADOL HYDROCHLORIDE 75 MG: 50 TABLET ORAL at 14:13

## 2024-11-10 RX ADMIN — PRAZOSIN HYDROCHLORIDE 1 MG: 1 CAPSULE ORAL at 20:52

## 2024-11-10 RX ADMIN — GABAPENTIN 300 MG: 300 CAPSULE ORAL at 08:05

## 2024-11-10 RX ADMIN — GABAPENTIN 300 MG: 300 CAPSULE ORAL at 20:52

## 2024-11-10 RX ADMIN — GABAPENTIN 300 MG: 300 CAPSULE ORAL at 14:17

## 2024-11-10 ASSESSMENT — PAIN DESCRIPTION - LOCATION
LOCATION: TEETH

## 2024-11-10 ASSESSMENT — PAIN SCALES - GENERAL
PAINLEVEL_OUTOF10: 6
PAINLEVEL_OUTOF10: 0
PAINLEVEL_OUTOF10: 4
PAINLEVEL_OUTOF10: 7
PAINLEVEL_OUTOF10: 7

## 2024-11-10 ASSESSMENT — PAIN DESCRIPTION - DESCRIPTORS
DESCRIPTORS: ACHING

## 2024-11-11 PROCEDURE — 1140000000 HC RM PRIVATE PSYCH

## 2024-11-11 PROCEDURE — 6370000000 HC RX 637 (ALT 250 FOR IP): Performed by: PSYCHIATRY & NEUROLOGY

## 2024-11-11 PROCEDURE — 6370000000 HC RX 637 (ALT 250 FOR IP): Performed by: NURSE PRACTITIONER

## 2024-11-11 RX ORDER — PRAZOSIN HYDROCHLORIDE 1 MG/1
1 CAPSULE ORAL NIGHTLY
Qty: 30 CAPSULE | Refills: 3 | Status: SHIPPED | OUTPATIENT
Start: 2024-11-11

## 2024-11-11 RX ORDER — QUETIAPINE FUMARATE 300 MG/1
600 TABLET, FILM COATED ORAL NIGHTLY
Qty: 60 TABLET | Refills: 3 | Status: SHIPPED | OUTPATIENT
Start: 2024-11-11

## 2024-11-11 RX ORDER — QUETIAPINE FUMARATE 200 MG/1
600 TABLET, FILM COATED ORAL NIGHTLY
Status: DISCONTINUED | OUTPATIENT
Start: 2024-11-11 | End: 2024-11-12 | Stop reason: HOSPADM

## 2024-11-11 RX ORDER — NICOTINE 21 MG/24HR
1 PATCH, TRANSDERMAL 24 HOURS TRANSDERMAL DAILY
Qty: 30 PATCH | Refills: 3 | Status: SHIPPED | OUTPATIENT
Start: 2024-11-12

## 2024-11-11 RX ORDER — BUPROPION HYDROCHLORIDE 300 MG/1
300 TABLET ORAL EVERY MORNING
Qty: 30 TABLET | Refills: 3 | Status: SHIPPED | OUTPATIENT
Start: 2024-11-12

## 2024-11-11 RX ORDER — HYDROXYZINE HYDROCHLORIDE 50 MG/1
50 TABLET, FILM COATED ORAL 3 TIMES DAILY PRN
Qty: 30 TABLET | Refills: 0 | Status: SHIPPED | OUTPATIENT
Start: 2024-11-11 | End: 2024-11-21

## 2024-11-11 RX ADMIN — Medication: at 11:51

## 2024-11-11 RX ADMIN — GABAPENTIN 300 MG: 300 CAPSULE ORAL at 08:33

## 2024-11-11 RX ADMIN — GABAPENTIN 300 MG: 300 CAPSULE ORAL at 13:29

## 2024-11-11 RX ADMIN — BUPROPION HYDROCHLORIDE 300 MG: 150 TABLET, EXTENDED RELEASE ORAL at 08:33

## 2024-11-11 RX ADMIN — QUETIAPINE FUMARATE 600 MG: 200 TABLET ORAL at 21:08

## 2024-11-11 RX ADMIN — TRAMADOL HYDROCHLORIDE 75 MG: 50 TABLET ORAL at 03:03

## 2024-11-11 RX ADMIN — TRAMADOL HYDROCHLORIDE 75 MG: 50 TABLET ORAL at 11:44

## 2024-11-11 RX ADMIN — PRAZOSIN HYDROCHLORIDE 1 MG: 1 CAPSULE ORAL at 21:08

## 2024-11-11 RX ADMIN — TRAMADOL HYDROCHLORIDE 75 MG: 50 TABLET ORAL at 17:37

## 2024-11-11 RX ADMIN — GABAPENTIN 300 MG: 300 CAPSULE ORAL at 21:08

## 2024-11-11 RX ADMIN — ACETAMINOPHEN 650 MG: 325 TABLET ORAL at 21:19

## 2024-11-11 ASSESSMENT — PAIN SCALES - GENERAL
PAINLEVEL_OUTOF10: 7
PAINLEVEL_OUTOF10: 3
PAINLEVEL_OUTOF10: 0
PAINLEVEL_OUTOF10: 7
PAINLEVEL_OUTOF10: 2
PAINLEVEL_OUTOF10: 0
PAINLEVEL_OUTOF10: 6
PAINLEVEL_OUTOF10: 2

## 2024-11-11 ASSESSMENT — PAIN DESCRIPTION - DESCRIPTORS
DESCRIPTORS: ACHING

## 2024-11-11 ASSESSMENT — PAIN DESCRIPTION - ORIENTATION
ORIENTATION: LEFT
ORIENTATION: LEFT
ORIENTATION: UPPER
ORIENTATION: LEFT

## 2024-11-11 ASSESSMENT — PAIN DESCRIPTION - LOCATION
LOCATION: TEETH
LOCATION: MOUTH
LOCATION: TEETH
LOCATION: TEETH

## 2024-11-11 ASSESSMENT — PAIN SCALES - WONG BAKER
WONGBAKER_NUMERICALRESPONSE: HURTS A LITTLE BIT
WONGBAKER_NUMERICALRESPONSE: NO HURT
WONGBAKER_NUMERICALRESPONSE: HURTS A LITTLE BIT

## 2024-11-11 ASSESSMENT — PAIN - FUNCTIONAL ASSESSMENT
PAIN_FUNCTIONAL_ASSESSMENT: ACTIVITIES ARE NOT PREVENTED

## 2024-11-11 NOTE — DISCHARGE INSTRUCTIONS
DISCHARGE SUMMARY    NAME:Caity Estrella  : 1985  MRN: [id number]    The patient Caity Estrella exhibits the ability to control behavior in a less restrictive environment.  Patient's level of functioning is improving.  No assaultive/destructive behavior has been observed for the past 24 hours.  No suicidal/homicidal threat or behavior has been observed for the past 24 hours.  There is no evidence of serious medication side effects.  Patient has not been in physical or protective restraints for at least the past 24 hours.    If weapons involved, how are they secured? None involved    Is patient aware of and in agreement with discharge plan? yes    Arrangements for medication:  Prescriptions in hand at discharge    Copy of discharge instructions to provider?:  yes    Arrangements for transportation home:  Lyft    Keep all follow up appointments as scheduled, continue to take prescribed medications per physician instructions.  Mental health crisis number:  911 or your local mental health crisis line number at [phone]      Mental Health Emergency WARM LINE      [phone] [phone])      M-F: 9am to 9pm      Sat & Sun: 5pm - 9pm  National suicide prevention lines:                             2-881-ZXVCBXE ([phone])       [phone]-TALK ([phone])    Crisis Text Line:  Text HOME to [phone]

## 2024-11-11 NOTE — GROUP NOTE
Group Therapy Note    Date: 11/11/2024    Group Start Time: 1500  Group End Time: 1600  Group Topic: Recreational    [facility] 3 ACUTE BEH[facility]    Allison oSuza        Group Therapy Note    Attendees: 5       Patient's Goal:  To concentrate on selected task    Notes:  Pt declined active participation,left session    Discipline Responsible: Recreational Therapist      Signature:  ALLISON SOUZA

## 2024-11-11 NOTE — PROGRESS NOTES
Night Shift assessment completed.   Caity was first encountered resting in bed, alert, calm, cooperative and free from distress. Affect constricted. Mood sad. Denies anxiety, depression, SI/HI/AH/VH and pain. C-SSRS, No risk. Interacts appropriately and able to make needs known. No concerns expressed.     Nursing Intervention: VS T 97.7 °F (36.5 °C),HR 86 R 16, B/P 124/84, O2 Sat 99 %. Med and meal compliant. Emotional support and encouragement provided. No side effects from medications observed. 03:03; PRN Tramadol was given for Toothache of 7/10    Response: Positive.    Plan: Monitoring for safety and behavior continues q 15 mins. Pt appears to have slept for 8 hours.

## 2024-11-12 VITALS
RESPIRATION RATE: 16 BRPM | DIASTOLIC BLOOD PRESSURE: 96 MMHG | OXYGEN SATURATION: 97 % | SYSTOLIC BLOOD PRESSURE: 138 MMHG | HEART RATE: 100 BPM | WEIGHT: 190 LBS | HEIGHT: 65 IN | BODY MASS INDEX: 31.65 KG/M2 | TEMPERATURE: 97.5 F

## 2024-11-12 PROCEDURE — 6370000000 HC RX 637 (ALT 250 FOR IP): Performed by: PSYCHIATRY & NEUROLOGY

## 2024-11-12 RX ADMIN — GABAPENTIN 300 MG: 300 CAPSULE ORAL at 07:54

## 2024-11-12 RX ADMIN — BUPROPION HYDROCHLORIDE 300 MG: 150 TABLET, EXTENDED RELEASE ORAL at 07:55

## 2024-11-12 RX ADMIN — TRAMADOL HYDROCHLORIDE 75 MG: 50 TABLET ORAL at 07:54

## 2024-11-12 ASSESSMENT — PAIN DESCRIPTION - LOCATION
LOCATION: TEETH
LOCATION: TEETH

## 2024-11-12 ASSESSMENT — PAIN SCALES - GENERAL
PAINLEVEL_OUTOF10: 7
PAINLEVEL_OUTOF10: 9

## 2024-11-12 ASSESSMENT — PAIN DESCRIPTION - DESCRIPTORS: DESCRIPTORS: ACHING

## 2024-11-12 NOTE — PLAN OF CARE
Problem: ABCDS Injury Assessment  Goal: Absence of physical injury  Outcome: Progressing     
  Problem: ABCDS Injury Assessment  Goal: Absence of physical injury  Outcome: Progressing     
  Problem: Anxiety  Goal: Will report anxiety at manageable levels  Description: INTERVENTIONS:  1. Administer medication as ordered  2. Teach and rehearse alternative coping skills  3. Provide emotional support with 1:1 interaction with staff  11/10/2024 0719 by Jimmy Saucedo RN  Outcome: Progressing  11/9/2024 2350 by Elda Parada RN  Outcome: Progressing     Problem: Coping  Goal: Pt/Family able to verbalize concerns and demonstrate effective coping strategies  Description: INTERVENTIONS:  1. Assist patient/family to identify coping skills, available support systems and cultural and spiritual values  2. Provide emotional support, including active listening and acknowledgement of concerns of patient and caregivers  3. Reduce environmental stimuli, as able  4. Instruct patient/family in relaxation techniques, as appropriate  5. Assess for spiritual pain/suffering and initiate Spiritual Care, Psychosocial Clinical Specialist consults as needed  11/9/2024 2350 by Elda Parada RN  Outcome: Progressing     
  Problem: Anxiety  Goal: Will report anxiety at manageable levels  Description: INTERVENTIONS:  1. Administer medication as ordered  2. Teach and rehearse alternative coping skills  3. Provide emotional support with 1:1 interaction with staff  Outcome: Not Progressing     Problem: Depression/Self Harm  Goal: Effect of psychiatric condition will be minimized and patient will be protected from self harm  Description: INTERVENTIONS:  1. Assess impact of patient's symptoms on level of functioning, self care needs and offer support as indicated  2. Assess patient/family knowledge of depression, impact on illness and need for teaching  3. Provide emotional support, presence and reassurance  4. Assess for possible suicidal thoughts or ideation. If patient expresses suicidal thoughts or statements do not leave alone, initiate Suicide Precautions, move to a room close to the nursing station and obtain sitter  5. Initiate consults as appropriate with Mental Health Professional, Spiritual Care, Psychosocial CNS, and consider a recommendation to the [facility]IP for a Psychiatric Consultation  Outcome: Not Progressing     
  Problem: Anxiety  Goal: Will report anxiety at manageable levels  Description: INTERVENTIONS:  1. Administer medication as ordered  2. Teach and rehearse alternative coping skills  3. Provide emotional support with 1:1 interaction with staff  Outcome: Progressing     Problem: Coping  Goal: Pt/Family able to verbalize concerns and demonstrate effective coping strategies  Description: INTERVENTIONS:  1. Assist patient/family to identify coping skills, available support systems and cultural and spiritual values  2. Provide emotional support, including active listening and acknowledgement of concerns of patient and caregivers  3. Reduce environmental stimuli, as able  4. Instruct patient/family in relaxation techniques, as appropriate  5. Assess for spiritual pain/suffering and initiate Spiritual Care, Psychosocial Clinical Specialist consults as needed  Outcome: Progressing     
  Problem: Coping  Goal: Pt/Family able to verbalize concerns and demonstrate effective coping strategies  Description: INTERVENTIONS:  1. Assist patient/family to identify coping skills, available support systems and cultural and spiritual values  2. Provide emotional support, including active listening and acknowledgement of concerns of patient and caregivers  3. Reduce environmental stimuli, as able  4. Instruct patient/family in relaxation techniques, as appropriate  5. Assess for spiritual pain/suffering and initiate Spiritual Care, Psychosocial Clinical Specialist consults as needed  11/7/2024 2154 by Elda Parada RN  Outcome: Progressing  11/7/2024 1108 by Mindi Noland RN  Outcome: Progressing     Problem: Anxiety  Goal: Will report anxiety at manageable levels  Description: INTERVENTIONS:  1. Administer medication as ordered  2. Teach and rehearse alternative coping skills  3. Provide emotional support with 1:1 interaction with staff  11/7/2024 2154 by Elda Parada RN  Outcome: Progressing  11/7/2024 1108 by Mindi Noland RN  Outcome: Progressing     
  Problem: Discharge Planning  Goal: Discharge to home or other facility with appropriate resources  11/7/2024 1108 by Mindi Noland RN  Outcome: Progressing  11/6/2024 2248 by Brandan Joseph RN  Outcome: Progressing     Problem: Pain  Goal: Verbalizes/displays adequate comfort level or baseline comfort level  11/7/2024 1108 by Mindi Noland RN  Outcome: Progressing  11/6/2024 2248 by Brandan Joseph RN  Outcome: Progressing     Problem: ABCDS Injury Assessment  Goal: Absence of physical injury  Outcome: Progressing     Problem: Anxiety  Goal: Will report anxiety at manageable levels  Description: INTERVENTIONS:  1. Administer medication as ordered  2. Teach and rehearse alternative coping skills  3. Provide emotional support with 1:1 interaction with staff  11/7/2024 1108 by Mindi Noland RN  Outcome: Progressing  11/6/2024 2248 by Brandan Joseph RN  Outcome: Progressing  Flowsheets (Taken 11/6/2024 1344 by Krystina Menon RN)  Will report anxiety at manageable levels: Administer medication as ordered     Problem: Coping  Goal: Pt/Family able to verbalize concerns and demonstrate effective coping strategies  Description: INTERVENTIONS:  1. Assist patient/family to identify coping skills, available support systems and cultural and spiritual values  2. Provide emotional support, including active listening and acknowledgement of concerns of patient and caregivers  3. Reduce environmental stimuli, as able  4. Instruct patient/family in relaxation techniques, as appropriate  5. Assess for spiritual pain/suffering and initiate Spiritual Care, Psychosocial Clinical Specialist consults as needed  11/7/2024 1108 by Mindi Noland RN  Outcome: Progressing  11/6/2024 2248 by Brandan Joseph RN  Outcome: Progressing  Flowsheets (Taken 11/6/2024 1344 by Krystina Menon RN)  Patient/family able to verbalize anxieties, fears, and concerns, and demonstrate effective coping: Reduce environmental stimuli, as 
  Problem: Discharge Planning  Goal: Discharge to home or other facility with appropriate resources  Outcome: HH/HSPC Resolved Met     Problem: Pain  Goal: Verbalizes/displays adequate comfort level or baseline comfort level  11/12/2024 0928 by Irasema Hawk RN  Outcome: HH/HSPC Resolved Met  11/11/2024 2113 by Kash Houston RN  Outcome: Not Progressing     Problem: ABCDS Injury Assessment  Goal: Absence of physical injury  11/12/2024 0928 by Irasema Hawk RN  Outcome: HH/HSPC Resolved Met  11/11/2024 2113 by Kash Houston RN  Outcome: Progressing     Problem: Anxiety  Goal: Will report anxiety at manageable levels  Description: INTERVENTIONS:  1. Administer medication as ordered  2. Teach and rehearse alternative coping skills  3. Provide emotional support with 1:1 interaction with staff  11/12/2024 0928 by Irasema Hawk RN  Outcome: HH/HSPC Resolved Met  11/11/2024 2113 by Kash Houston RN  Outcome: Progressing     Problem: Coping  Goal: Pt/Family able to verbalize concerns and demonstrate effective coping strategies  Description: INTERVENTIONS:  1. Assist patient/family to identify coping skills, available support systems and cultural and spiritual values  2. Provide emotional support, including active listening and acknowledgement of concerns of patient and caregivers  3. Reduce environmental stimuli, as able  4. Instruct patient/family in relaxation techniques, as appropriate  5. Assess for spiritual pain/suffering and initiate Spiritual Care, Psychosocial Clinical Specialist consults as needed  11/12/2024 0928 by Irasema Hawk RN  Outcome: HH/HSPC Resolved Met  11/11/2024 2113 by Kash Houston RN  Outcome: Progressing     Problem: Decision Making  Goal: Pt/Family able to effectively weigh alternatives and participate in decision making related to treatment and care  Description: INTERVENTIONS:  1. Determine when there are differences between patient's view, family's view, and healthcare provider's view 
  Problem: Discharge Planning  Goal: Discharge to home or other facility with appropriate resources  Outcome: Progressing     Problem: Coping  Goal: Pt/Family able to verbalize concerns and demonstrate effective coping strategies  Description: INTERVENTIONS:  1. Assist patient/family to identify coping skills, available support systems and cultural and spiritual values  2. Provide emotional support, including active listening and acknowledgement of concerns of patient and caregivers  3. Reduce environmental stimuli, as able  4. Instruct patient/family in relaxation techniques, as appropriate  5. Assess for spiritual pain/suffering and initiate Spiritual Care, Psychosocial Clinical Specialist consults as needed  Outcome: Progressing     Problem: Decision Making  Goal: Pt/Family able to effectively weigh alternatives and participate in decision making related to treatment and care  Description: INTERVENTIONS:  1. Determine when there are differences between patient's view, family's view, and healthcare provider's view of condition  2. Facilitate patient and family articulation of goals for care  3. Help patient and family identify pros/cons of alternative solutions  4. Provide information as requested by patient/family  5. Respect patient/family right to receive or not to receive information  6. Serve as a liaison between patient and family and health care team  7. Initiate Consults from Ethics, Palliative Care or initiate Family Care Conference as is appropriate  Outcome: Progressing     
  Problem: Discharge Planning  Goal: Discharge to home or other facility with appropriate resources  Outcome: Progressing     Problem: Pain  Goal: Verbalizes/displays adequate comfort level or baseline comfort level  11/5/2024 1018 by Mindi Nloand RN  Outcome: Progressing  11/4/2024 2256 by Evelina Marquez RN  Outcome: Not Progressing     Problem: ABCDS Injury Assessment  Goal: Absence of physical injury  Outcome: Progressing     Problem: Coping  Goal: Pt/Family able to verbalize concerns and demonstrate effective coping strategies  Description: INTERVENTIONS:  1. Assist patient/family to identify coping skills, available support systems and cultural and spiritual values  2. Provide emotional support, including active listening and acknowledgement of concerns of patient and caregivers  3. Reduce environmental stimuli, as able  4. Instruct patient/family in relaxation techniques, as appropriate  5. Assess for spiritual pain/suffering and initiate Spiritual Care, Psychosocial Clinical Specialist consults as needed  11/5/2024 1018 by Mindi Noland RN  Outcome: Progressing  11/4/2024 2256 by Evelina Marquez RN  Outcome: Progressing     Problem: Pain  Goal: Verbalizes/displays adequate comfort level or baseline comfort level  11/5/2024 1018 by Mindi Noland RN  Outcome: Progressing  11/4/2024 2256 by Evelina Marquez RN  Outcome: Not Progressing     
  Problem: Discharge Planning  Goal: Discharge to home or other facility with appropriate resources  Outcome: Progressing     Problem: Pain  Goal: Verbalizes/displays adequate comfort level or baseline comfort level  Outcome: Progressing     Problem: Anxiety  Goal: Will report anxiety at manageable levels  Description: INTERVENTIONS:  1. Administer medication as ordered  2. Teach and rehearse alternative coping skills  3. Provide emotional support with 1:1 interaction with staff  11/6/2024 2248 by Brandan Joseph RN  Outcome: Progressing  Flowsheets (Taken 11/6/2024 1344 by Krystina Menon RN)  Will report anxiety at manageable levels: Administer medication as ordered  11/6/2024 1341 by Krystina Menon RN  Outcome: Progressing     Problem: Coping  Goal: Pt/Family able to verbalize concerns and demonstrate effective coping strategies  Description: INTERVENTIONS:  1. Assist patient/family to identify coping skills, available support systems and cultural and spiritual values  2. Provide emotional support, including active listening and acknowledgement of concerns of patient and caregivers  3. Reduce environmental stimuli, as able  4. Instruct patient/family in relaxation techniques, as appropriate  5. Assess for spiritual pain/suffering and initiate Spiritual Care, Psychosocial Clinical Specialist consults as needed  11/6/2024 2248 by Brandan Joseph RN  Outcome: Progressing  Flowsheets (Taken 11/6/2024 1344 by Krystina Menon RN)  Patient/family able to verbalize anxieties, fears, and concerns, and demonstrate effective coping: Reduce environmental stimuli, as able  11/6/2024 1341 by Krystina Menon RN  Outcome: Progressing     Problem: Decision Making  Goal: Pt/Family able to effectively weigh alternatives and participate in decision making related to treatment and care  Description: INTERVENTIONS:  1. Determine when there are differences between patient's view, family's view, and healthcare provider's view of 
  Problem: Pain  Goal: Verbalizes/displays adequate comfort level or baseline comfort level  11/11/2024 2113 by Kash Houston RN  Outcome: Not Progressing     Problem: ABCDS Injury Assessment  Goal: Absence of physical injury  11/11/2024 2113 by Kash Houston RN  Outcome: Progressing     Problem: Anxiety  Goal: Will report anxiety at manageable levels  Description: INTERVENTIONS:  1. Administer medication as ordered  2. Teach and rehearse alternative coping skills  3. Provide emotional support with 1:1 interaction with staff  11/11/2024 2113 by Kash Houston RN  Outcome: Progressing     Problem: Coping  Goal: Pt/Family able to verbalize concerns and demonstrate effective coping strategies  Description: INTERVENTIONS:  1. Assist patient/family to identify coping skills, available support systems and cultural and spiritual values  2. Provide emotional support, including active listening and acknowledgement of concerns of patient and caregivers  3. Reduce environmental stimuli, as able  4. Instruct patient/family in relaxation techniques, as appropriate  5. Assess for spiritual pain/suffering and initiate Spiritual Care, Psychosocial Clinical Specialist consults as needed  Outcome: Progressing     Problem: Decision Making  Goal: Pt/Family able to effectively weigh alternatives and participate in decision making related to treatment and care  Description: INTERVENTIONS:  1. Determine when there are differences between patient's view, family's view, and healthcare provider's view of condition  2. Facilitate patient and family articulation of goals for care  3. Help patient and family identify pros/cons of alternative solutions  4. Provide information as requested by patient/family  5. Respect patient/family right to receive or not to receive information  6. Serve as a liaison between patient and family and health care team  7. Initiate Consults from Ethics, Palliative Care or initiate Family Care Conference as is 
0730 At this time Jade was encountered in her room lying down resting in bed. Constricted affect. Labile mood. Denied SI, HI and A/VH, endorsed  depression and anxiety. Remains isolated to her room most of the time. CSSRS No Risk. Sleeping well. Appetite fair. Compliant with medications. No side effects reported. Remains on Q15 minute rounds for safety.      Problem: Discharge Planning  Goal: Discharge to home or other facility with appropriate resources  11/6/2024 0545 by Brandan Joseph, RN  Outcome: Progressing     Problem: Anxiety  Goal: Will report anxiety at manageable levels  Description: INTERVENTIONS:  1. Administer medication as ordered  2. Teach and rehearse alternative coping skills  3. Provide emotional support with 1:1 interaction with staff  Outcome: Progressing     Problem: Coping  Goal: Pt/Family able to verbalize concerns and demonstrate effective coping strategies  Description: INTERVENTIONS:  1. Assist patient/family to identify coping skills, available support systems and cultural and spiritual values  2. Provide emotional support, including active listening and acknowledgement of concerns of patient and caregivers  3. Reduce environmental stimuli, as able  4. Instruct patient/family in relaxation techniques, as appropriate  5. Assess for spiritual pain/suffering and initiate Spiritual Care, Psychosocial Clinical Specialist consults as needed  11/6/2024 1341 by Krystina Menon RN  Outcome: Progressing  11/6/2024 0545 by Brandan Joseph RN  Outcome: Progressing     Problem: Decision Making  Goal: Pt/Family able to effectively weigh alternatives and participate in decision making related to treatment and care  Description: INTERVENTIONS:  1. Determine when there are differences between patient's view, family's view, and healthcare provider's view of condition  2. Facilitate patient and family articulation of goals for care  3. Help patient and family identify pros/cons of alternative 
Problem: Discharge Planning  Goal: Discharge to home or other facility with appropriate resources  Outcome: Not Progressing     Problem: Psychosis  Goal: Will report no hallucinations or delusions  Description: INTERVENTIONS:  1. Administer medication as  ordered  2. Assist with reality testing to support increasing orientation  3. Assess if patient's hallucinations or delusions are encouraging self harm or harm to others and intervene as appropriate  Outcome: Not Progressing     
Problem: Discharge Planning  Goal: Discharge to home or other facility with appropriate resources  Outcome: Progressing     Problem: Psychosis  Goal: Will report no hallucinations or delusions  Description: INTERVENTIONS:  1. Administer medication as  ordered  2. Assist with reality testing to support increasing orientation  3. Assess if patient's hallucinations or delusions are encouraging self harm or harm to others and intervene as appropriate  Outcome: Progressing    Problem: Self Harm/Suicidality  Goal: Will have no self-injury during hospital stay  Description: INTERVENTIONS:  1.  Ensure constant observer at bedside with Q15M safety checks  2.  Maintain a safe environment  3.  Secure patient belongings  4.  Ensure family/visitors adhere to safety recommendations  5.  Ensure safety tray has been added to patient's diet order  6.  Every shift and PRN: Re-assess suicidal risk via Frequent Screener    Outcome: Progressing     
Pt received at 1930, Alert,  , and Cooperative with Constricted affect. Pt's vital signs were T 97.6 °F (36.4 °C), HR 57, RR 19, /81, and O2 98 %. Pt endorsed stomach pain, denied anxiety and endorsed stomach upset. Pt replied yes to both SI and HI but denied having a plan and evidenced adequate insight into her own situation with no self-harming or threatening behavior, gestures, or threats, and was willing to participate and remain compliant with the behavior plan. Pt replied Auditory (vague / incomprehensible) to hallucinations. Pt had speech that was   and Unremarkable in quality. Pt provided with 1:1 RN assessment with active listening and therapeutic presence for a brief period in the evening. Pt encouraged to participate in the evening milieu including leisure activities, snack time, and positive peer interaction. Pt cooperated with all scheduled medications and received PRN Tylenol for pain and PRN Maalox for indigestion. Pt monitored q15min throughout the night. Pt was isolative throughout the evening and rested in bed for 9 hours overnight.    Problem: Pain  Goal: Verbalizes/displays adequate comfort level or baseline comfort level  Outcome: Not Progressing     Problem: Coping  Goal: Pt/Family able to verbalize concerns and demonstrate effective coping strategies  Description: INTERVENTIONS:  1. Assist patient/family to identify coping skills, available support systems and cultural and spiritual values  2. Provide emotional support, including active listening and acknowledgement of concerns of patient and caregivers  3. Reduce environmental stimuli, as able  4. Instruct patient/family in relaxation techniques, as appropriate  5. Assess for spiritual pain/suffering and initiate Spiritual Care, Psychosocial Clinical Specialist consults as needed  Outcome: Progressing     
appropriate manner  4. Utilize positive, consistent limit setting strategies supporting safety of patient, staff and others  5. Encourage participation in the decision making process about the behavioral management agreement  6. If a visitor's behavior poses a threat to safety call refer to organization policy.  7. Initiate consult with , Psychosocial CNS, Spiritual Care as appropriate  11/10/2024 2125 by Esmer Brady RN  Outcome: Progressing     Problem: Depression/Self Harm  Goal: Effect of psychiatric condition will be minimized and patient will be protected from self harm  Description: INTERVENTIONS:  1. Assess impact of patient's symptoms on level of functioning, self care needs and offer support as indicated  2. Assess patient/family knowledge of depression, impact on illness and need for teaching  3. Provide emotional support, presence and reassurance  4. Assess for possible suicidal thoughts or ideation. If patient expresses suicidal thoughts or statements do not leave alone, initiate Suicide Precautions, move to a room close to the nursing station and obtain sitter  5. Initiate consults as appropriate with Mental Health Professional, Spiritual Care, Psychosocial CNS, and consider a recommendation to the LIP for a Psychiatric Consultation  11/10/2024 2125 by Esmer Brady RN  Outcome: Progressing     Problem: Self Harm/Suicidality  Goal: Will have no self-injury during hospital stay  Description: INTERVENTIONS:  1.  Ensure constant observer at bedside with Q15M safety checks  2.  Maintain a safe environment  3.  Secure patient belongings  4.  Ensure family/visitors adhere to safety recommendations  5.  Ensure safety tray has been added to patient's diet order  6.  Every shift and PRN: Re-assess suicidal risk via Frequent Screener    11/10/2024 2125 by Esmer Brady RN  Outcome: Progressing     Problem: Psychosis  Goal: Will report no hallucinations or

## 2024-11-16 NOTE — BH NOTE
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 crisis line number at [phone]        Mental Health Emergency WARM LINE      [phone] ([phone])      M-F: 9am to 9pm      Sat & Sun: 5pm - 9pm  National suicide prevention lines:                             2-724-YEGBEAS (3-385-112-2319)       4-771-454-TALK ([phone])    Crisis Text Line:  Text HOME to [phone]      ______________________________________________  TREVER Ferguson, Supervisee in Social Work  [phone]  [email or web address]  24    
Evening assessment complete. Patient was encountered in her room. VS are stable. BP elevated 132/94.She is calm and cooperative.   Patient has confirmed pain rated 7/10 and was medicated with Tylenol for pain relief, with a follow up score of 3/10. Eye contact is noted to be fair. Mood is noted to be sad. Affect is constricted. Patient currently reports that there are no signs or symptoms of depression or anxiety,also denies all SI/HI, AVH. C-SSRS level is No risk.   Patient has been isolative to herself remaining in her room for the night.     Patient is med compliant. There are no untoward side effects from medications to note.     Hourly rounds are being completed to assure patient safety and attend to care needs. Monitoring will continue for changes in patient condition      SLEEP HOURS-8  
Late entry for 11/1012024    Patient was given Ultram during dayshift 7am to 7pm. Two encounters were not recorded in [employer].  The first half tab was wasted with Yadira Reilly RN  and the second was a forgotten waste and discarded into the trash.   
PT remains alert and oriented x 4, she is calm and cooperative at this time with the assessment process. PT denies SI/HI, depression and anxiety. She does report having auditory hallucinations of voices that she can not make out what they are saying. There are no other issues to note at this time. Pt remains med and meal compliant. Motoring will continue.   
Patient is  alert, calm and cooperative. She report feeling okay. Patient endorsed pain of 9/10 to the tooth. PRN  tramadol administered. C-SSR is no risk. Patient denied  depression,  anxiety, SI, HI, and AVH. Patient is compliant with meds and meals. No aggressive behavior noted. Emotional support and encouragement provided. Q 15 minutes and hourly rounds in progress. VS were T 97.4 °F (36.3 °C), HR 96, RR 16, BP (!) 116/95, O2 99 %. Pt slept for the total of 7.5 hours.     
Patient slept 9 hours this shift.   
Patient was visible, calm and cooperative. She report feeling better. Patient endorsed pain of 9/10 to the tooth. PRN tylenol and tramadol administered x 2. C-SSR is no risk. Patient endorsed  depression of 6/10,  and AH. She denied anxiety, SI, HI, and VH. Patient is compliant with meds and meals. No aggressive behavior noted. Emotional support and encouragement provided. Q 15 minutes and hourly rounds in progress. VS were T 97.4 °F (36.3 °C), HR 96, RR 16, BP (!) 116/95, O2 99 %. Pt slept for the total of 6 hours.   
Pt  is alert and oriented x 4. She appears anxious and presents with a flat affect. She denies all SI/HI, AVH, anxiety and confirms some feelings of depression at this time. There is no noted issues with aggression. There are noted issues with pain and discomfort from a broken tooth. PT was medicated with Ultram and Motrin. Monitoring will continue for changes.   
Pt came to writer c/o 10/10 tooth pain. Requested \"new medicine\" doctor was prescribing. Pt given PRN tramadol 50 mg PO for tooth pain. Pt also given hot pack per pt request.   
Pt is alert and oriented x 4. She is labile and demanding. Pt had to be given redirection while waiting for orders to be put into the system. She was seen and heard cursing in the hallway.  She denies all SI/HI, AVH, anxiety and denies feelings of depression at this time. There are noted issues with pain and discomfort related to a broken tooth. PT medicated multiple times throughout the shift. Monitoring will continue for changes.   
Pt provided with discharge instructions, belongings, valuables, and prescribed medications. Pt voiced understanding of instructions, no questions or concerns voiced. Pt discharged to rehabilitation facility via Ly[facility] as arranged by SAHARA at 1200 pm.   
Pt received in hallway requested pain medication for tooth pain. Affect seems worried, eye contact good. A&Ox4. Denies SI/HI/AVH. Endorses anxiety of 6/10 r/t discharge to substance abuse treatment facility today. Pt given PRN tramadol 50 mg PO with scheduled morning medications. Pt remains visible and appropriately engaged in milieu.Thought process and content are unremarkable. Q15 minute rounds maintained for safety.   
Received the patient in the day room, conversant with peers. Pt is alert and oriented x 4. Pt denies anxiety and depression. Denies SI, HI,VH. Endorses auditory hallucination of voices \"talking about people\"  and \"bad nightmares\". Still complaining of tooth pain. Tramadol 75mg tablet PRN given at 1144. Pt appears calm and pleasant. No violent or aggressive behavior. Visible and social with peers. Independent with adls.Compliant with scheduled medications. S/B treatment team today, for DC in the morning and increased Seroquel to 600mg. Continue on Q 15 min safety check.   
Received the patient this morning, walking in the hallway quietly. On assessment pt reports hearing voices \"all the time talking about other people\". Pt denies SI, HI and VH. Rates her generalized anxiety 5/10, depression \"little bit\". Pt appears to be upset with another pt from entering her room last night and verbalizes to this writer that she will \"go off\" if this patient comes back to her room again, patient was reassured. Pt is compliant with her scheduled medications. No violent or aggressive behavior issues. Pt has been calm and cooperative. Pt reported her tooth filling fell off  while brushing her teeth and having a \"really bad pain\". Ice pack and PRN Tylenol given. S/B treatment team today, new orders made. Continue on Q 15 min safety check.  
Received this patient this morning laying in bed, awake. Alert and oriented x 4. On assessment pt endorses hearing voices telling her \"to be violent but I will try to isolate myself\". Pt appears anxious and agitated. Requesting medication to help her, Haldol 5 mg  PO and Hydroxyzine 50 mg PRN given. Pt endorses SI without a plan. Denies HI/VH. Pt slept for about 2 hrs after the PRN. S/B the treatment team, re-started her medications. Pt c/o \"bladder inflammation\" and asking if she can get something for it. Pt denies any symptoms to this writer.  Dr. Reyes was made aware, push fluids for now(no signs of infection). Pt was made aware and agreeable with it. Pt is compliant with her scheduled medications and meals. No violent/aggressive behavior observed. Labs to be done in the morning. Continue on Q 15 min safety check.   
    Vitals:    11/09/24 0925 11/09/24 2018 11/09/24 2033 11/10/24 0835   BP: 127/86 118/87 118/87 (!) 132/92   Pulse: 76 (!) 103  93   Resp: 16 21  16   Temp: 97.8 °F (36.6 °C) 98.4 °F (36.9 °C)  97.8 °F (36.6 °C)   TempSrc: Oral Oral  Oral   SpO2: 100% 98%  99%   Weight:       Height:            Mental Status Exam:   Sensorium  oriented to time, place and person   Relations guarded and passive   Eye Contact appropriate   Appearance:  age appropriate   Speech:  normal volume and non-pressured   Thought Process: goal directed   Thought Content homicidal and suicidal   Suicidal ideations no plan    Mood:  depressed   Affect:  constricted   Memory   adequate   Concentration:  adequate   Insight:  limited   Judgment:  impaired due to condition       MEDICATIONS:  Current Facility-Administered Medications   Medication Dose Route Frequency    QUEtiapine (SEROQUEL) tablet 400 mg  400 mg Oral Nightly    benzocaine (ORAJEL) 20 % mucosal gel   Mouth/Throat 4x Daily PRN    prazosin (MINIPRESS) capsule 1 mg  1 mg Oral Nightly    traMADol (ULTRAM) tablet 75 mg  75 mg Oral Q6H PRN    naloxone (NARCAN) injection 0.4 mg  0.4 mg IntraMUSCular PRN    buPROPion (WELLBUTRIN XL) extended release tablet 300 mg  300 mg Oral QAM    gabapentin (NEURONTIN) capsule 300 mg  300 mg Oral TID    ibuprofen (ADVIL;MOTRIN) tablet 800 mg  800 mg Oral Q8H PRN    ondansetron (ZOFRAN-ODT) disintegrating tablet 4 mg  4 mg Oral Q8H PRN    chlorproMAZINE (THORAZINE) tablet 50 mg  50 mg Oral 4x Daily PRN    Or    chlorproMAZINE (THORAZINE) injection 50 mg  50 mg IntraMUSCular 4x Daily PRN    acetaminophen (TYLENOL) tablet 650 mg  650 mg Oral Q4H PRN    polyethylene glycol (GLYCOLAX) packet 17 g  17 g Oral Daily PRN    aluminum & magnesium hydroxide-simethicone (MAALOX) 200-200-20 MG/5ML suspension 30 mL  30 mL Oral Q6H PRN    hydrOXYzine HCl (ATARAX) tablet 50 mg  50 mg Oral TID PRN    diphenhydrAMINE (BENADRYL) injection 50 mg  50 mg IntraMUSCular Q4H PRN 
  DISCUSSION:   the risks and benefits of the proposed medication    Lab/Data Review:  All lab results for the last 24 hours reviewed.    No results found for this or any previous visit (from the past 24 hour(s)).        Assessment:     Principal Problem:    Substance induced mood disorder (HCC)  Resolved Problems:    * No resolved hospital problems. *      Plan:     Continue current care  Collateral information  Increase Seroquel 400 mg PO Q hs  Prazosin 1 mg PO Q hs  Increase Orajel QID prn  Disposition planning with social work    I certify that this patient's inpatient psychiatric hospital services furnished since the previous certification were, and continue to be, required for treatment that could reasonably be expected to improve the patient's condition, or for diagnostic study, and that the patient continues to need, on a daily basis, active treatment furnished directly by or requiring the supervision of inpatient psychiatric facility personnel. In addition, the hospital records show that services furnished were intensive treatment services, admission or related services, or equivalent services.  Signed By: Gabriella Rodriguez, APRN - NP     November 9, 2024      
certify that this patient's inpatient psychiatric hospital services furnished since the previous certification were, and continue to be, required for treatment that could reasonably be expected to improve the patient's condition, or for diagnostic study, and that the patient continues to need, on a daily basis, active treatment furnished directly by or requiring the supervision of inpatient psychiatric facility personnel. In addition, the hospital records show that services furnished were intensive treatment services, admission or related services, or equivalent services.  Signed By: Elmer Reyes MD     November 7, 2024